# Patient Record
Sex: MALE | Race: BLACK OR AFRICAN AMERICAN | URBAN - METROPOLITAN AREA
[De-identification: names, ages, dates, MRNs, and addresses within clinical notes are randomized per-mention and may not be internally consistent; named-entity substitution may affect disease eponyms.]

---

## 2019-05-10 ENCOUNTER — HOSPITAL ENCOUNTER (EMERGENCY)
Facility: CLINIC | Age: 54
Discharge: HOME | End: 2019-05-10
Attending: EMERGENCY MEDICINE | Admitting: EMERGENCY MEDICINE

## 2019-05-10 ENCOUNTER — TRANSFERRED RECORDS (OUTPATIENT)
Dept: HEALTH INFORMATION MANAGEMENT | Facility: CLINIC | Age: 54
End: 2019-05-10

## 2019-05-10 ENCOUNTER — APPOINTMENT (OUTPATIENT)
Dept: GENERAL RADIOLOGY | Facility: CLINIC | Age: 54
End: 2019-05-10
Attending: EMERGENCY MEDICINE

## 2019-05-10 VITALS
TEMPERATURE: 98.7 F | SYSTOLIC BLOOD PRESSURE: 137 MMHG | RESPIRATION RATE: 15 BRPM | HEART RATE: 67 BPM | DIASTOLIC BLOOD PRESSURE: 94 MMHG | OXYGEN SATURATION: 100 %

## 2019-05-10 DIAGNOSIS — M79.602 LEFT ARM PAIN: ICD-10-CM

## 2019-05-10 DIAGNOSIS — M54.2 NECK PAIN: ICD-10-CM

## 2019-05-10 DIAGNOSIS — R07.9 CHEST PAIN, UNSPECIFIED TYPE: ICD-10-CM

## 2019-05-10 LAB
ALBUMIN SERPL-MCNC: 3.6 G/DL (ref 3.4–5)
ALP SERPL-CCNC: 57 U/L (ref 40–150)
ALT SERPL W P-5'-P-CCNC: 37 U/L (ref 0–70)
ANION GAP SERPL CALCULATED.3IONS-SCNC: 5 MMOL/L (ref 3–14)
AST SERPL W P-5'-P-CCNC: 23 U/L (ref 0–45)
BASOPHILS # BLD AUTO: 0 10E9/L (ref 0–0.2)
BASOPHILS NFR BLD AUTO: 0.4 %
BILIRUB SERPL-MCNC: 0.5 MG/DL (ref 0.2–1.3)
BUN SERPL-MCNC: 13 MG/DL (ref 7–30)
CALCIUM SERPL-MCNC: 8.8 MG/DL (ref 8.5–10.1)
CHLORIDE SERPL-SCNC: 104 MMOL/L (ref 94–109)
CO2 SERPL-SCNC: 28 MMOL/L (ref 20–32)
CREAT SERPL-MCNC: 0.72 MG/DL (ref 0.66–1.25)
DIFFERENTIAL METHOD BLD: ABNORMAL
EOSINOPHIL # BLD AUTO: 0.2 10E9/L (ref 0–0.7)
EOSINOPHIL NFR BLD AUTO: 4 %
ERYTHROCYTE [DISTWIDTH] IN BLOOD BY AUTOMATED COUNT: 15.7 % (ref 10–15)
GFR SERPL CREATININE-BSD FRML MDRD: >90 ML/MIN/{1.73_M2}
GLUCOSE SERPL-MCNC: 97 MG/DL (ref 70–99)
HCT VFR BLD AUTO: 37.7 % (ref 40–53)
HGB BLD-MCNC: 11.4 G/DL (ref 13.3–17.7)
IMM GRANULOCYTES # BLD: 0 10E9/L (ref 0–0.4)
IMM GRANULOCYTES NFR BLD: 0.2 %
LIPASE SERPL-CCNC: 112 U/L (ref 73–393)
LYMPHOCYTES # BLD AUTO: 2 10E9/L (ref 0.8–5.3)
LYMPHOCYTES NFR BLD AUTO: 39.2 %
MCH RBC QN AUTO: 22.7 PG (ref 26.5–33)
MCHC RBC AUTO-ENTMCNC: 30.2 G/DL (ref 31.5–36.5)
MCV RBC AUTO: 75 FL (ref 78–100)
MONOCYTES # BLD AUTO: 0.6 10E9/L (ref 0–1.3)
MONOCYTES NFR BLD AUTO: 11 %
NEUTROPHILS # BLD AUTO: 2.3 10E9/L (ref 1.6–8.3)
NEUTROPHILS NFR BLD AUTO: 45.2 %
NRBC # BLD AUTO: 0 10*3/UL
NRBC BLD AUTO-RTO: 0 /100
PLATELET # BLD AUTO: 152 10E9/L (ref 150–450)
POTASSIUM SERPL-SCNC: 3.8 MMOL/L (ref 3.4–5.3)
PROT SERPL-MCNC: 7.7 G/DL (ref 6.8–8.8)
RBC # BLD AUTO: 5.02 10E12/L (ref 4.4–5.9)
SODIUM SERPL-SCNC: 137 MMOL/L (ref 133–144)
TROPONIN I SERPL-MCNC: <0.015 UG/L (ref 0–0.04)
WBC # BLD AUTO: 5 10E9/L (ref 4–11)

## 2019-05-10 PROCEDURE — 84484 ASSAY OF TROPONIN QUANT: CPT | Performed by: EMERGENCY MEDICINE

## 2019-05-10 PROCEDURE — 85025 COMPLETE CBC W/AUTO DIFF WBC: CPT | Performed by: EMERGENCY MEDICINE

## 2019-05-10 PROCEDURE — 99285 EMERGENCY DEPT VISIT HI MDM: CPT | Mod: 25 | Performed by: EMERGENCY MEDICINE

## 2019-05-10 PROCEDURE — 80053 COMPREHEN METABOLIC PANEL: CPT | Performed by: EMERGENCY MEDICINE

## 2019-05-10 PROCEDURE — 83690 ASSAY OF LIPASE: CPT | Performed by: EMERGENCY MEDICINE

## 2019-05-10 PROCEDURE — 72040 X-RAY EXAM NECK SPINE 2-3 VW: CPT

## 2019-05-10 PROCEDURE — 71046 X-RAY EXAM CHEST 2 VIEWS: CPT

## 2019-05-10 PROCEDURE — 93005 ELECTROCARDIOGRAM TRACING: CPT | Performed by: EMERGENCY MEDICINE

## 2019-05-10 PROCEDURE — 93010 ELECTROCARDIOGRAM REPORT: CPT | Mod: Z6 | Performed by: EMERGENCY MEDICINE

## 2019-05-10 RX ORDER — METHYLPREDNISOLONE 4 MG
TABLET, DOSE PACK ORAL
Qty: 21 TABLET | Refills: 0 | Status: SHIPPED | OUTPATIENT
Start: 2019-05-10

## 2019-05-10 ASSESSMENT — ENCOUNTER SYMPTOMS
NAUSEA: 0
VOMITING: 0
ABDOMINAL PAIN: 0
SHORTNESS OF BREATH: 0
FEVER: 0
BACK PAIN: 1

## 2019-05-10 NOTE — ED AVS SNAPSHOT
[facility], Emergency Department  [address] [place] [address]  Phone:  [phone]  Fax:  [phone]                                    John Anderson   MRN: [id number]    Department:  [facility], Emergency Department   Date of Visit:  5/10/2019           After Visit Summary Signature Page    I have received my discharge instructions, and my questions have been answered. I have discussed any challenges I see with this plan with the nurse or doctor.    ..........................................................................................................................................  Patient/Patient Representative Signature      ..........................................................................................................................................  Patient Representative Print Name and Relationship to Patient    ..................................................               ................................................  Date                                   Time    ..........................................................................................................................................  Reviewed by Signature/Title    ...................................................              ..............................................  Date                                               Time          22EPIC Rev 08/18

## 2019-05-10 NOTE — ED PROVIDER NOTES
History     Chief Complaint   Patient presents with     Chest Pain     chest pain x2 wks     The history is provided by the patient, a friend and medical records. The history is limited by a language barrier. A  was used (Oromo).     John Anderson is a 54 year old Oromo- speaking male who presents to the Emergency Department from Urgent care for further evaluation of 2 weeks of left sided neck radiating from his neck down to his left lower extremity. Per chart review, patient was seen at [facility] and was sent to the ED for abnormal EKG. Patient reports of current minor left shoulder pain, left upper back pain, and cervical spine pain. He notes that he is not experiencing any major pain but was more concerned about the longevity of his left sided pain.  He denies a fever. He denies nausea or vomiting, . He denies a history of HTN.  He denies fevers, cough, or abdominal pain or shortness of breath .  His pain has improved since being in the ED  Social Here with friend     No past medical history on file.    No past surgical history on file.    No family history on file.    Social History     Tobacco Use     Smoking status: Not on file   Substance Use Topics     Alcohol use: Not on file       No current facility-administered medications for this encounter.      Current Outpatient Medications   Medication     methylPREDNISolone (MEDROL DOSEPAK) 4 MG tablet therapy pack      No Known Allergies    I have reviewed the Medications, Allergies, Past Medical and Surgical History, and Social History in the Epic system.    Review of Systems   Constitutional: Negative for fever.   Respiratory: Negative for shortness of breath.    Cardiovascular: Negative for chest pain.   Gastrointestinal: Negative for abdominal pain, nausea and vomiting.   Musculoskeletal: Positive for back pain.        Positive for left-sided pain radiating from cervical spine down to left lower extremity for past 2 weeks   All other  systems reviewed and are negative.      Physical Exam   BP: (!) 150/97  Pulse: 67  Heart Rate: 64  Temp: 98.7  F (37.1  C)  Resp: 20  SpO2: 100 %      Physical Exam   Neck:       Musculoskeletal:        Cervical back: He exhibits tenderness. He exhibits normal range of motion, no bony tenderness, no swelling, no edema, no deformity, no laceration, no pain, no spasm and normal pulse.        Back:         Arms:    Physical Exam   Constitutional:   well nourished, well developed, resting comfortably   HENT:   Head: Normocephalic and atraumatic.   Eyes: Conjunctivae are normal. Pupils are equal, round, and reactive to light.   pharynx has no erythema or exudate, mucous membranes are moist  Neck:   no adenopathy, tender over C 5/6 area with radicular pain as in diagram above  Cardiovascular: regular rate and rhythm without murmurs or gallops  Pulmonary/Chest: Clear to auscultation bilaterally, with no wheezes or retractions. No respiratory distress.  GI: Soft with good bowel sounds.  Non-tender, non-distended  Back:  Tenderness to trapezius area   No bony or CVA tenderness   Musculoskeletal:  no edema or clubbing FROM of left shoulder, left elbow with full flexion, extension, supination and pronation, left wrist is nontender,  strength is full. Good radial pulses  Skin: Skin is warm and dry. No rash noted.   Neurological: alert and oriented to person, place, and time. Nonfocal exam, GCS is 15,   Psychiatric:  normal mood and affect.   ED Course   4:15 PM  The patient was seen and examined by Ana Luisa Amos MD in Room ED08.        Procedures              EKG Interpretation:      Interpreted by Ana Luisa Amos  Time reviewed:1610 pm   Symptoms at time of EKG: see hpi   Rhythm: Normal sinus   Rate: Normal  Axis: Normal  Ectopy: None  Conduction: Normal  ST Segments/ T Waves: No ST-T wave changes, T wave inversion in lead III and aVR, No acute ischemic changes and Early repolarization  Q Waves: None  Comparison to prior:  Unchanged from earlier today.    Clinical Impression: Normal sinus rhythm, rate of 63 bpm.  Patient has early repolarization with no acute ST elevation.          Critical Care time:  none             Labs Ordered and Resulted from Time of ED Arrival Up to the Time of Departure from the ED   CBC WITH PLATELETS DIFFERENTIAL - Abnormal; Notable for the following components:       Result Value    Hemoglobin 11.4 (*)     Hematocrit 37.7 (*)     MCV 75 (*)     MCH 22.7 (*)     MCHC 30.2 (*)     RDW 15.7 (*)     All other components within normal limits   COMPREHENSIVE METABOLIC PANEL   LIPASE   TROPONIN I     Results for orders placed or performed during the hospital encounter of 05/10/19   XR Chest 2 Views    Narrative    XR CHEST TWO VIEWS  5/10/2019 4:51 PM     HISTORY: Radicular neck pain, left arm pain.    COMPARISON: None.      Impression    IMPRESSION: No active cardiopulmonary disease.   Cervical spine XR, 2-3 views    Narrative    XR CERVICAL SPINE TWO-THREE VIEWS  5/10/2019 4:53 PM     HISTORY: Radicular left neck pain.    COMPARISON: None.      Impression    IMPRESSION: There is straightening of the normal cervical lordosis. No  significant loss of vertebral body height. Mild degenerative changes  and loss of intervertebral disc space throughout the cervical spine  most pronounced at C3-C4 and C5-C6. The base of the dens is obscured  by the maxilla on the odontoid view.    AYSHA BISWAS MD   CBC with platelets differential   Result Value Ref Range    WBC 5.0 4.0 - 11.0 10e9/L    RBC Count 5.02 4.4 - 5.9 10e12/L    Hemoglobin 11.4 (L) 13.3 - 17.7 g/dL    Hematocrit 37.7 (L) 40.0 - 53.0 %    MCV 75 (L) 78 - 100 fl    MCH 22.7 (L) 26.5 - 33.0 pg    MCHC 30.2 (L) 31.5 - 36.5 g/dL    RDW 15.7 (H) 10.0 - 15.0 %    Platelet Count 152 150 - 450 10e9/L    Diff Method Automated Method     % Neutrophils 45.2 %    % Lymphocytes 39.2 %    % Monocytes 11.0 %    % Eosinophils 4.0 %    % Basophils 0.4 %    % Immature  Granulocytes 0.2 %    Nucleated RBCs 0 0 /100    Absolute Neutrophil 2.3 1.6 - 8.3 10e9/L    Absolute Lymphocytes 2.0 0.8 - 5.3 10e9/L    Absolute Monocytes 0.6 0.0 - 1.3 10e9/L    Absolute Eosinophils 0.2 0.0 - 0.7 10e9/L    Absolute Basophils 0.0 0.0 - 0.2 10e9/L    Abs Immature Granulocytes 0.0 0 - 0.4 10e9/L    Absolute Nucleated RBC 0.0    Comprehensive metabolic panel   Result Value Ref Range    Sodium 137 133 - 144 mmol/L    Potassium 3.8 3.4 - 5.3 mmol/L    Chloride 104 94 - 109 mmol/L    Carbon Dioxide 28 20 - 32 mmol/L    Anion Gap 5 3 - 14 mmol/L    Glucose 97 70 - 99 mg/dL    Urea Nitrogen 13 7 - 30 mg/dL    Creatinine 0.72 0.66 - 1.25 mg/dL    GFR Estimate >90 >60 mL/min/[1.73_m2]    GFR Estimate If Black >90 >60 mL/min/[1.73_m2]    Calcium 8.8 8.5 - 10.1 mg/dL    Bilirubin Total 0.5 0.2 - 1.3 mg/dL    Albumin 3.6 3.4 - 5.0 g/dL    Protein Total 7.7 6.8 - 8.8 g/dL    Alkaline Phosphatase 57 40 - 150 U/L    ALT 37 0 - 70 U/L    AST 23 0 - 45 U/L   Lipase   Result Value Ref Range    Lipase 112 73 - 393 U/L   Troponin I   Result Value Ref Range    Troponin I ES <0.015 0.000 - 0.045 ug/L    v       Assessments & Plan (with Medical Decision Making)       I have reviewed the nursing notes.  Emergency Department course:  The patient was seen and examined at 1615 pm.   EKG shows Normal sinus rhythm, rate of 63 bpm.  Patient has early repolarization with no acute ST elevation.    Laboratory studies show a troponin that is less than 0.015.  Comprehensive metabolic panel is unremarkable.  CBC shows anemia, with a hemoglobin of 11.4.  WBC is 5.0    Chest x-ray shows no cardiopulmonary abnormality.  Cervical spine x-ray shows IMPRESSION: There is straightening of the normal cervical lordosis. No  significant loss of vertebral body height. Mild degenerative changes  and loss of intervertebral disc space throughout the cervical spine  most pronounced at C3-C4 and C5-C6. The base of the dens is obscured  by the maxilla  on the odontoid view.    The patient is a 54-year-old [place]- speaking male who presents with neck and left arm pain.  I believe this is likely radicular neck pain, most likely from the C5/C6 vertebra.  He was sent from urgent care for an abnormal EKG.  The EKG shows early repolarization and is unlikely to represent acute coronary syndrome.  He has had the pain for 2 weeks, has no associated symptoms and his troponin today is less than 0.015.  His pain is more in the left neck, left trapezius area and radiating down his left arm.  This is consistent with radicular neck pain.  I believe he is safe to be discharged home.  I recommending using ice to the sore areas and taking ibuprofen or Tylenol as needed for pain.  I prescribed a Medrol Dosepak as well.  He will need to follow-up with his regular physician for recheck within 1 to 2 weeks.  He was also given the clinic number to the neurosurgery clinic for follow-up on discharge.  I have reviewed the findings, diagnosis, plan and need for follow up with the patient.       Medication List      Started    methylPREDNISolone 4 MG tablet therapy pack  Commonly known as:  MEDROL DOSEPAK  Follow Package Directions            Final diagnoses:   Neck pain - radicular     IYudi , am serving as a trained medical scribe to document services personally performed by Ana Luisa Amos MD, based on the provider's statements to me.      IAna Luisa MD, was physically present and have reviewed and verified the accuracy of this note documented by Yudi Muhammad.   This note was created in part by the use of Dragon voice recognition dictation system. Inadvertent grammatical errors and typographical errors may still exist.  Ana Luisa Amos MD      5/10/2019   [facility] EMERGENCY DEPARTMENT     Ana Luisa Amos MD  05/10/19 [id number]

## 2019-05-10 NOTE — DISCHARGE INSTRUCTIONS
Please make an appointment to follow up with Neurosurgery Clinic (phone: [phone]) in 7-10 days.  Use Medrol Dosepak as directed.  Take ibuprofen 600 mg every 6 hours with food or Tylenol 1 g every 8 hours as needed for pain.  Ice to the sore areas may help.  If your pain continues, please follow-up with the neurosurgery clinic.

## 2019-05-10 NOTE — ED TRIAGE NOTES
Pt states through friend interpreting for him, that he has had chest pain x2 weeks, no n/v, pt states he is dizzy at times, felt that because it hasn't gone away for 2 weeks that he should come in.  Pt states he is not feeling chest pain right now but when he does have it it radiates down his left arm.  Pt points to the left neck and shoulder area when describing for chest pain, pt touches left rib cage and lower left side of abd for area he feels the pain in.

## 2019-05-11 LAB — INTERPRETATION ECG - MUSE: NORMAL

## 2019-05-11 NOTE — ED PROVIDER NOTES
Addition to physical exam:    NEUROLOGIC: speech normal, mental status intact, muscle tone normal, muscle strength normal,  Shoulder shrug strong and symmetric. Full biceps, triceps strength. Good distal pulses  Motor: normal bulk/tone, no muscle wasting or fasciculations  No pronator drift, Romberg negative  Gait: normal, able to hold legs off bed against gravity, strong dorsi and plantar flexion.    MD Dandre Crisostomo Alda L, MD  05/11/19 0604

## 2024-02-07 ENCOUNTER — TRANSCRIBE ORDERS (OUTPATIENT)
Dept: OTHER | Age: 59
End: 2024-02-07

## 2024-02-07 DIAGNOSIS — D17.1 LIPOMA OF LOWER BACK: Primary | ICD-10-CM
